# Patient Record
Sex: MALE | Race: BLACK OR AFRICAN AMERICAN | NOT HISPANIC OR LATINO | Employment: UNEMPLOYED | URBAN - METROPOLITAN AREA
[De-identification: names, ages, dates, MRNs, and addresses within clinical notes are randomized per-mention and may not be internally consistent; named-entity substitution may affect disease eponyms.]

---

## 2021-07-14 ENCOUNTER — MEDICAL CORRESPONDENCE (OUTPATIENT)
Dept: HEALTH INFORMATION MANAGEMENT | Facility: CLINIC | Age: 41
End: 2021-07-14

## 2021-07-14 ENCOUNTER — TRANSFERRED RECORDS (OUTPATIENT)
Dept: HEALTH INFORMATION MANAGEMENT | Facility: CLINIC | Age: 41
End: 2021-07-14

## 2022-02-03 ENCOUNTER — OFFICE VISIT (OUTPATIENT)
Dept: FAMILY MEDICINE | Facility: CLINIC | Age: 42
End: 2022-02-03
Payer: COMMERCIAL

## 2022-02-03 VITALS
HEIGHT: 68 IN | SYSTOLIC BLOOD PRESSURE: 124 MMHG | WEIGHT: 183.8 LBS | OXYGEN SATURATION: 97 % | BODY MASS INDEX: 27.86 KG/M2 | HEART RATE: 72 BPM | DIASTOLIC BLOOD PRESSURE: 68 MMHG

## 2022-02-03 DIAGNOSIS — G44.219 EPISODIC TENSION-TYPE HEADACHE, NOT INTRACTABLE: ICD-10-CM

## 2022-02-03 DIAGNOSIS — K64.4 EXTERNAL HEMORRHOIDS: ICD-10-CM

## 2022-02-03 DIAGNOSIS — N52.9 ERECTILE DYSFUNCTION, UNSPECIFIED ERECTILE DYSFUNCTION TYPE: ICD-10-CM

## 2022-02-03 DIAGNOSIS — Z00.00 ROUTINE HISTORY AND PHYSICAL EXAMINATION OF ADULT: Primary | ICD-10-CM

## 2022-02-03 PROBLEM — E78.1 HYPERTRIGLYCERIDEMIA: Status: ACTIVE | Noted: 2022-02-03

## 2022-02-03 LAB
ANION GAP SERPL CALCULATED.3IONS-SCNC: 8 MMOL/L (ref 5–18)
BUN SERPL-MCNC: 9 MG/DL (ref 8–22)
CALCIUM SERPL-MCNC: 10.2 MG/DL (ref 8.5–10.5)
CHLORIDE BLD-SCNC: 105 MMOL/L (ref 98–107)
CHOLEST SERPL-MCNC: 221 MG/DL
CO2 SERPL-SCNC: 26 MMOL/L (ref 22–31)
CREAT SERPL-MCNC: 1.08 MG/DL (ref 0.7–1.3)
ERYTHROCYTE [DISTWIDTH] IN BLOOD BY AUTOMATED COUNT: 16.6 % (ref 10–15)
FASTING STATUS PATIENT QL REPORTED: YES
GFR SERPL CREATININE-BSD FRML MDRD: 90 ML/MIN/1.73M2
GLUCOSE BLD-MCNC: 95 MG/DL (ref 70–125)
HCT VFR BLD AUTO: 43.8 % (ref 40–53)
HDLC SERPL-MCNC: 29 MG/DL
HGB BLD-MCNC: 13.3 G/DL (ref 13.3–17.7)
LDLC SERPL CALC-MCNC: 84 MG/DL
LDLC SERPL CALC-MCNC: ABNORMAL MG/DL
MCH RBC QN AUTO: 22.3 PG (ref 26.5–33)
MCHC RBC AUTO-ENTMCNC: 30.4 G/DL (ref 31.5–36.5)
MCV RBC AUTO: 74 FL (ref 78–100)
PLATELET # BLD AUTO: 243 10E3/UL (ref 150–450)
POTASSIUM BLD-SCNC: 4 MMOL/L (ref 3.5–5)
RBC # BLD AUTO: 5.96 10E6/UL (ref 4.4–5.9)
SODIUM SERPL-SCNC: 139 MMOL/L (ref 136–145)
TRIGL SERPL-MCNC: 799 MG/DL
WBC # BLD AUTO: 4.1 10E3/UL (ref 4–11)

## 2022-02-03 PROCEDURE — 99385 PREV VISIT NEW AGE 18-39: CPT | Mod: 25 | Performed by: INTERNAL MEDICINE

## 2022-02-03 PROCEDURE — 99213 OFFICE O/P EST LOW 20 MIN: CPT | Mod: 25 | Performed by: INTERNAL MEDICINE

## 2022-02-03 PROCEDURE — 36415 COLL VENOUS BLD VENIPUNCTURE: CPT | Performed by: INTERNAL MEDICINE

## 2022-02-03 PROCEDURE — 80048 BASIC METABOLIC PNL TOTAL CA: CPT | Performed by: INTERNAL MEDICINE

## 2022-02-03 PROCEDURE — 90472 IMMUNIZATION ADMIN EACH ADD: CPT | Performed by: INTERNAL MEDICINE

## 2022-02-03 PROCEDURE — 90715 TDAP VACCINE 7 YRS/> IM: CPT | Performed by: INTERNAL MEDICINE

## 2022-02-03 PROCEDURE — 90471 IMMUNIZATION ADMIN: CPT | Performed by: INTERNAL MEDICINE

## 2022-02-03 PROCEDURE — 83721 ASSAY OF BLOOD LIPOPROTEIN: CPT | Mod: 59 | Performed by: INTERNAL MEDICINE

## 2022-02-03 PROCEDURE — 90686 IIV4 VACC NO PRSV 0.5 ML IM: CPT | Performed by: INTERNAL MEDICINE

## 2022-02-03 PROCEDURE — 80061 LIPID PANEL: CPT | Performed by: INTERNAL MEDICINE

## 2022-02-03 PROCEDURE — 85027 COMPLETE CBC AUTOMATED: CPT | Performed by: INTERNAL MEDICINE

## 2022-02-03 RX ORDER — LIDOCAINE HYDROCHLORIDE 20 MG/ML
JELLY TOPICAL DAILY PRN
Qty: 30 ML | Refills: 1 | Status: SHIPPED | OUTPATIENT
Start: 2022-02-03

## 2022-02-03 RX ORDER — CITALOPRAM HYDROBROMIDE 10 MG/1
10 TABLET ORAL DAILY
Qty: 30 TABLET | Refills: 1 | Status: SHIPPED | OUTPATIENT
Start: 2022-02-03 | End: 2023-05-09

## 2022-02-03 ASSESSMENT — MIFFLIN-ST. JEOR: SCORE: 1557.21

## 2022-02-03 NOTE — PATIENT INSTRUCTIONS
We will do labs today as we discussed.    Tetanus vaccine and flu vaccines today.    Try the citalopram to help with ejaculation start at 5 mg per day for 2 weeks and increase to 10 mg if no response after that. Can send me an update if things are not working well.     Try the lidocaine over the head area daily- put on before reading. We will have you see neurology for next steps as well.    They should call to set up with colorectal surgery.    Preventive Health Recommendations  Male Ages 26 - 39    Yearly exam:             See your health care provider every year in order to  o   Review health changes.   o   Discuss preventive care.    o   Review your medicines if your doctor has prescribed any.    You should be tested each year for STDs (sexually transmitted diseases), if you re at risk.     After age 35, talk to your provider about cholesterol testing. If you are at risk for heart disease, have your cholesterol tested at least every 5 years.     If you are at risk for diabetes, you should have a diabetes test (fasting glucose).  Shots: Get a flu shot each year. Get a tetanus shot every 10 years.     Nutrition:    Eat at least 5 servings of fruits and vegetables daily.     Eat whole-grain bread, whole-wheat pasta and brown rice instead of white grains and rice.     Get adequate Calcium and Vitamin D.     Lifestyle    Exercise for at least 150 minutes a week (30 minutes a day, 5 days a week). This will help you control your weight and prevent disease.     Limit alcohol to one drink per day.     No smoking.     Wear sunscreen to prevent skin cancer.     See your dentist every six months for an exam and cleaning.

## 2022-02-03 NOTE — PROGRESS NOTES
"SUBJECTIVE:   CC: Federica Llamas is an 41 year old male who presents for preventative health visit.     {Split Bill scripting  The purpose of this visit is to discuss your medical history and prevent health problems before you are sick. You may be responsible for a co-pay, coinsurance, or deductible if your visit today includes services such as checking on a sore throat, having an x-ray or lab test, or treating and evaluating a new or existing condition :020639}  {Patient advised of split billing (Optional):857076}  HPI  {Add if <65 person on Medicare  - Required Questions (Optional):422963}  {Outside tests to abstract? :923111}    {additional problems to add (Optional):197638}    Today's PHQ-2 Score: No flowsheet data found.    Abuse: Current or Past(Physical, Sexual or Emotional)- { :055442}  Do you feel safe in your environment? { :529329}    Have you ever done Advance Care Planning? (For example, a Health Directive, POLST, or a discussion with a medical provider or your loved ones about your wishes): { :663284}    Social History     Tobacco Use     Smoking status: Not on file     Smokeless tobacco: Not on file   Substance Use Topics     Alcohol use: Not on file     {Rooming Staff- Complete this question if Prescreen response is not shown below for today's visit. If you drink alcohol do you typically have >3 drinks per day or >7 drinks per week? (Optional):491820}    No flowsheet data found.{add AUDIT responses (Optional) (A score of 7 for adult men is an indication of hazardous drinking; a score of 8 or more is an indication of an alcohol use disorder.  A score of 7 or more for adult women is an indication of hazardous drinking or an alchohol use disorder):683409}    Last PSA: No results found for: PSA    Reviewed orders with patient. Reviewed health maintenance and updated orders accordingly - { :330613::\"Yes\"}  {Chronicprobdata (optional):602941}    Reviewed and updated as needed this visit by clinical " "staff                Reviewed and updated as needed this visit by Provider               {HISTORY OPTIONS (Optional):883982}    Review of Systems  {MALE ROS (Optional):564477::\"CONSTITUTIONAL: NEGATIVE for fever, chills, change in weight\",\"INTEGUMENTARY/SKIN: NEGATIVE for worrisome rashes, moles or lesions\",\"EYES: NEGATIVE for vision changes or irritation\",\"ENT: NEGATIVE for ear, mouth and throat problems\",\"RESP: NEGATIVE for significant cough or SOB\",\"CV: NEGATIVE for chest pain, palpitations or peripheral edema\",\"GI: NEGATIVE for nausea, abdominal pain, heartburn, or change in bowel habits\",\" male: negative for dysuria, hematuria, decreased urinary stream, erectile dysfunction, urethral discharge\",\"MUSCULOSKELETAL: NEGATIVE for significant arthralgias or myalgia\",\"NEURO: NEGATIVE for weakness, dizziness or paresthesias\",\"PSYCHIATRIC: NEGATIVE for changes in mood or affect\"}    OBJECTIVE:   There were no vitals taken for this visit.    Physical Exam  {Exam Choices (Optional):116484}    {Diagnostic Test Results (Optional):863170::\"Diagnostic Test Results:\",\"Labs reviewed in Epic\"}    ASSESSMENT/PLAN:   {Diag Picklist:482137}    {Patient advised of split billing (Optional):033851}    COUNSELING:   {MALE COUNSELING MESSAGES:763964::\"Reviewed preventive health counseling, as reflected in patient instructions\"}    There is no height or weight on file to calculate BMI.     {Weight Management Plan (ACO) Complete if BMI is abnormal-  Ages 18-64  BMI >24.9.  Age 65+ with BMI <23 or >30 (Optional):544257}    She has no history on file for tobacco use.      Counseling Resources:  ATP IV Guidelines  Pooled Cohorts Equation Calculator  FRAX Risk Assessment  ICSI Preventive Guidelines  Dietary Guidelines for Americans, 2010  USDA's MyPlate  ASA Prophylaxis  Lung CA Screening    Umair Blum MD  [facility]  "

## 2022-02-03 NOTE — PROGRESS NOTES
SUBJECTIVE:   CC: Federica Llamas is an 41 year old male who presents for preventative health visit.       Patient has been advised of split billing requirements and indicates understanding: Yes  Healthy Habits:     Getting at least 3 servings of Calcium per day:  Yes    Bi-annual eye exam:  Yes    Dental care twice a year:  NO    Sleep apnea or symptoms of sleep apnea:  None    Diet:  Regular (no restrictions)    Frequency of exercise:  2-3 days/week    Duration of exercise:  Greater than 60 minutes    Taking medications regularly:  Not Applicable    Medication side effects:  Not applicable    PHQ-2 Total Score: 0    Additional concerns today:  Yes    Headaches- have been going on for awhile. Tried amitriptyline before. Headaches are mostly located in the top of the head- start only when start reading. Burning until done reading. Does not notice when Last eye exam- in July was normal.  Headaches were so severe to stop school.   - other medications that tried:   - previously had headache that lasted longer period of time.  - some soreness when pushes on the top of the head  - no headaches that wake from sleep  - no headaches with exercise.     Noted some blood in the stool- depends on diet and things that eats. Notes drop of blood that comes out. No itching. Noted for over 20 years.   - no prior treatments.     Noting premature ejaculation.             Today's PHQ-2 Score:   PHQ-2 ( 1999 Pf[employer]) 2/3/2022   Q1: Little interest or pleasure in doing things 0   Q2: Feeling down, depressed or hopeless 0   PHQ-2 Score 0   Q1: Little interest or pleasure in doing things Not at all   Q2: Feeling down, depressed or hopeless Not at all   PHQ-2 Score 0       Abuse: Current or Past(Physical, Sexual or Emotional)- No  Do you feel safe in your environment? Yes    Have you ever done Advance Care Planning? (For example, a Health Directive, POLST, or a discussion with a medical provider or your loved ones about your wishes): No,  "advance care planning information given to patient to review.  Patient plans to discuss their wishes with loved ones or provider.      Notes that ejaculation seems quick- mood is good. No back pain or other symptoms.     Social History     Tobacco Use     Smoking status: Never Smoker     Smokeless tobacco: Never Used   Substance Use Topics     Alcohol use: Yes     Comment: social drinker         Alcohol Use 2/3/2022   Prescreen: >3 drinks/day or >7 drinks/week? No       Last PSA: No results found for: PSA    Reviewed orders with patient. Reviewed health maintenance and updated orders accordingly - Yes  Lab work is in process    Reviewed and updated as needed this visit by clinical staff  Tobacco  Allergies  Meds  Problems  Med Hx  Surg Hx  Fam Hx         Reviewed and updated as needed this visit by Provider  Tobacco  Allergies  Meds  Problems  Med Hx  Surg Hx  Fam Hx            Review of Systems  A complete ROS was done and was otherwise negative.     OBJECTIVE:   /68 (BP Location: Right arm, Patient Position: Sitting, Cuff Size: Adult Large)   Pulse 72   Ht 1.727 m (5' 8\")   Wt 83.4 kg (183 lb 12.8 oz)   SpO2 97%   BMI 27.95 kg/m      Physical Exam  General: alert, interactive, NAD  HEENT: sclerae clear- MMM without lesions noted normal TM,s mild tenderness along the ridge of the mid head area, no rashes, CN 2-12 grossly intact  Neck: supple, no LAD  CV: RRR, no m/r/c  Resp: clear, no wheezing, easy work of breathing  Abdomen: +bs, non-tender  Ext: warm and well perfused,  no edema  Psych: appropriate affect  Neuro: no focal deficits noted.  : noted multiple large external hemorrhoids      Diagnostic Test Results:  Labs reviewed in Epic    ASSESSMENT/PLAN:       ICD-10-CM    1. Routine history and physical examination of adult  Z00.00 INFLUENZA VACCINE IM > 6 MONTHS VALENT IIV4 (AFLURIA/FLUZONE)     TDAP VACCINE (Adacel, Boostrix)  [[id number]]     Lipid panel reflex to direct LDL Fasting    "  Basic metabolic panel  (Ca, Cl, CO2, Creat, Gluc, K, Na, BUN)     CBC with platelets     Lipid panel reflex to direct LDL Fasting     Basic metabolic panel  (Ca, Cl, CO2, Creat, Gluc, K, Na, BUN)     CBC with platelets   2. Episodic tension-type headache, not intractable - refer to neuro, seem tension related, will try topical therapy G44.219 Adult Neurology  Referral     lidocaine (XYLOCAINE) 2 % external gel   3. External hemorrhoids - will refer to CR due to size. May consider colonoscopy K64.4 Colorectal Surgery Referral   4. Erectile dysfunction, unspecified erectile dysfunction type - discussed trial of ssri, will start as noted N52.9 citalopram (CELEXA) 10 MG tablet       Patient Instructions   We will do labs today as we discussed.    Tetanus vaccine and flu vaccines today.    Try the citalopram to help with ejaculation start at 5 mg per day for 2 weeks and increase to 10 mg if no response after that. Can send me an update if things are not working well.     Try the lidocaine over the head area daily- put on before reading. We will have you see neurology for next steps as well.    They should call to set up with colorectal surgery.    Preventive Health Recommendations  Male Ages 26 - 39    Yearly exam:             See your health care provider every year in order to  o   Review health changes.   o   Discuss preventive care.    o   Review your medicines if your doctor has prescribed any.    You should be tested each year for STDs (sexually transmitted diseases), if you re at risk.     After age 35, talk to your provider about cholesterol testing. If you are at risk for heart disease, have your cholesterol tested at least every 5 years.     If you are at risk for diabetes, you should have a diabetes test (fasting glucose).  Shots: Get a flu shot each year. Get a tetanus shot every 10 years.     Nutrition:    Eat at least 5 servings of fruits and vegetables daily.     Eat whole-grain bread,  "whole-wheat pasta and brown rice instead of white grains and rice.     Get adequate Calcium and Vitamin D.     Lifestyle    Exercise for at least 150 minutes a week (30 minutes a day, 5 days a week). This will help you control your weight and prevent disease.     Limit alcohol to one drink per day.     No smoking.     Wear sunscreen to prevent skin cancer.     See your dentist every six months for an exam and cleaning.         COUNSELING:   Reviewed preventive health counseling, as reflected in patient instructions    Estimated body mass index is 27.95 kg/m  as calculated from the following:    Height as of this encounter: 5' 8\" (1.727 m).    Weight as of this encounter: 183 lb 12.8 oz (83.4 kg).     Weight management plan: Discussed healthy diet and exercise guidelines    She reports that he has never smoked. He has never used smokeless tobacco.      Counseling Resources:  ATP IV Guidelines  Pooled Cohorts Equation Calculator  FRAX Risk Assessment  ICSI Preventive Guidelines  Dietary Guidelines for Americans, 2010  USDA's MyPlate  ASA Prophylaxis  Lung CA Screening    Umair Blum MD  [facility]  "

## 2022-03-21 NOTE — TELEPHONE ENCOUNTER
Diagnosis, Referred by & from: Hemorrhoids   Appt date: 6/16/2022   NOTES STATUS DETAILS   OFFICE NOTE from referring provider Internal [facility]:  2/3/22 - [facility] OV with Dr. Blum   OFFICE NOTE from other specialist N/A    DISCHARGE SUMMARY from hospital N/A    DISCHARGE REPORT from the ER N/A    OPERATIVE REPORT N/A    MEDICATION LIST Internal    LABS N/A    DIAGNOSTIC PROCEDURES N/A    IMAGING (DISC & REPORT) N/A

## 2022-06-16 ENCOUNTER — PRE VISIT (OUTPATIENT)
Dept: SURGERY | Facility: CLINIC | Age: 42
End: 2022-06-16

## 2022-08-03 NOTE — TELEPHONE ENCOUNTER
FUTURE VISIT INFORMATION      FUTURE VISIT INFORMATION:    Date: 9/20/2022    Time: 8am    Location: [facility]  REFERRAL INFORMATION:    Referring provider:  Dr. Blum     Referring providers clinic:  [facility]     Reason for visit/diagnosis  Headaches/Migraines     RECORDS REQUESTED FROM:       Clinic name Comments Records Status Imaging Status   Internal Dr. Blum-2/3/2022 [facility] No Images          [facility]   Scanned to Chart No Images

## 2022-09-20 ENCOUNTER — VIRTUAL VISIT (OUTPATIENT)
Dept: NEUROLOGY | Facility: CLINIC | Age: 42
End: 2022-09-20
Payer: COMMERCIAL

## 2022-09-20 ENCOUNTER — PRE VISIT (OUTPATIENT)
Dept: NEUROLOGY | Facility: CLINIC | Age: 42
End: 2022-09-20

## 2022-09-20 DIAGNOSIS — G44.89 NUMMULAR HEADACHE: Primary | ICD-10-CM

## 2022-09-20 DIAGNOSIS — M79.2 NEUROPATHIC PAIN: ICD-10-CM

## 2022-09-20 PROCEDURE — 99205 OFFICE O/P NEW HI 60 MIN: CPT | Mod: 95 | Performed by: PSYCHIATRY & NEUROLOGY

## 2022-09-20 RX ORDER — GABAPENTIN 600 MG/1
600 TABLET ORAL PRN
Qty: 90 TABLET | Refills: 0 | Status: SHIPPED | OUTPATIENT
Start: 2022-09-20

## 2022-09-20 NOTE — PROGRESS NOTES
Federica is a 41 year old who is being evaluated via a billable video visit.      How would you like to obtain your AVS? Mail  If the video visit is dropped, the invitation should be resent by: [phone]        Video-Visit Details    Video Start Time: 0800    Type of service:  Video Visit    Video End Time:8:56 AM    Originating Location (pt. Location): Home    Distant Location (provider location):  [facility] NEUROLOGY [facility]     Platform used for Video Visit: Lacie [phone]   Please use phone to reach Belgian  [phone]

## 2022-09-20 NOTE — PATIENT INSTRUCTIONS
Obtain imaging to rule out conditions like tumor, stroke, and infection:  - MRI brain w/wout contrast    Obtain testing of the blood to look for reversible causes of burning sensations:  - B12, B6, CRP, ESR, TSH    Try a neuropathic pain medication to see if it reduced your head dysesthesias:  - Gabapentin 600 mg PRN (take at onset of head dysesthesias)
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
## 2022-10-03 ENCOUNTER — HEALTH MAINTENANCE LETTER (OUTPATIENT)
Age: 42
End: 2022-10-03

## 2023-05-20 ENCOUNTER — HEALTH MAINTENANCE LETTER (OUTPATIENT)
Age: 43
End: 2023-05-20

## 2023-10-09 DIAGNOSIS — N52.9 ERECTILE DYSFUNCTION, UNSPECIFIED ERECTILE DYSFUNCTION TYPE: ICD-10-CM

## 2023-10-09 RX ORDER — CITALOPRAM HYDROBROMIDE 10 MG/1
10 TABLET ORAL DAILY
Qty: 30 TABLET | Refills: 0 | Status: SHIPPED | OUTPATIENT
Start: 2023-10-09

## 2023-10-09 NOTE — TELEPHONE ENCOUNTER
10/09/23  Attempted to call with assistance of Sudanese . Someone other than pt answered and stated pt is not at home. Asked them to have pt give a call back.  Fartun

## 2023-10-10 NOTE — TELEPHONE ENCOUNTER
10/10/23  Attempted to call pt with assistance of Romanian . Someone other than pt answered and stated pt was not available before disconnecting the call. Pt needs an appt.   Fartun

## 2023-10-11 NOTE — TELEPHONE ENCOUNTER
10/11/23  Attempted to call pt's mobile with assistance of [place] .  was unable to get through to pt. [employer] message sent.  Fartun

## 2024-10-05 ENCOUNTER — HEALTH MAINTENANCE LETTER (OUTPATIENT)
Age: 44
End: 2024-10-05